# Patient Record
Sex: FEMALE | Race: WHITE | NOT HISPANIC OR LATINO | Employment: OTHER | URBAN - METROPOLITAN AREA
[De-identification: names, ages, dates, MRNs, and addresses within clinical notes are randomized per-mention and may not be internally consistent; named-entity substitution may affect disease eponyms.]

---

## 2021-04-20 ENCOUNTER — OFFICE VISIT - HEALTHEAST (OUTPATIENT)
Dept: FAMILY MEDICINE | Facility: CLINIC | Age: 36
End: 2021-04-20

## 2021-04-20 DIAGNOSIS — D23.9 DYSPLASTIC NEVI: ICD-10-CM

## 2021-04-20 DIAGNOSIS — Z13.29 SCREENING FOR THYROID DISORDER: ICD-10-CM

## 2021-04-20 DIAGNOSIS — N20.0 NEPHROLITHIASIS: ICD-10-CM

## 2021-04-20 DIAGNOSIS — Z13.220 SCREENING, LIPID: ICD-10-CM

## 2021-04-20 DIAGNOSIS — Z00.00 ROUTINE MEDICAL EXAM: ICD-10-CM

## 2021-04-20 DIAGNOSIS — Z97.5 IUD (INTRAUTERINE DEVICE) IN PLACE: ICD-10-CM

## 2021-04-20 DIAGNOSIS — Z13.1 SCREENING FOR DIABETES MELLITUS: ICD-10-CM

## 2021-04-20 LAB
ANION GAP SERPL CALCULATED.3IONS-SCNC: 8 MMOL/L (ref 5–18)
BUN SERPL-MCNC: 13 MG/DL (ref 8–22)
CALCIUM SERPL-MCNC: 9.1 MG/DL (ref 8.5–10.5)
CHLORIDE BLD-SCNC: 106 MMOL/L (ref 98–107)
CHOLEST SERPL-MCNC: 244 MG/DL
CO2 SERPL-SCNC: 26 MMOL/L (ref 22–31)
CREAT SERPL-MCNC: 0.75 MG/DL (ref 0.6–1.1)
FASTING STATUS PATIENT QL REPORTED: YES
GFR SERPL CREATININE-BSD FRML MDRD: >60 ML/MIN/1.73M2
GLUCOSE BLD-MCNC: 93 MG/DL (ref 70–125)
HDLC SERPL-MCNC: 62 MG/DL
LDLC SERPL CALC-MCNC: 166 MG/DL
POTASSIUM BLD-SCNC: 3.8 MMOL/L (ref 3.5–5)
SODIUM SERPL-SCNC: 140 MMOL/L (ref 136–145)
TRIGL SERPL-MCNC: 81 MG/DL
TSH SERPL DL<=0.005 MIU/L-ACNC: 2.2 UIU/ML (ref 0.3–5)

## 2021-04-20 ASSESSMENT — ANXIETY QUESTIONNAIRES
2. NOT BEING ABLE TO STOP OR CONTROL WORRYING: NOT AT ALL
7. FEELING AFRAID AS IF SOMETHING AWFUL MIGHT HAPPEN: NOT AT ALL
6. BECOMING EASILY ANNOYED OR IRRITABLE: NOT AT ALL
3. WORRYING TOO MUCH ABOUT DIFFERENT THINGS: NOT AT ALL
GAD7 TOTAL SCORE: 0
5. BEING SO RESTLESS THAT IT IS HARD TO SIT STILL: NOT AT ALL
1. FEELING NERVOUS, ANXIOUS, OR ON EDGE: NOT AT ALL
4. TROUBLE RELAXING: NOT AT ALL
IF YOU CHECKED OFF ANY PROBLEMS ON THIS QUESTIONNAIRE, HOW DIFFICULT HAVE THESE PROBLEMS MADE IT FOR YOU TO DO YOUR WORK, TAKE CARE OF THINGS AT HOME, OR GET ALONG WITH OTHER PEOPLE: NOT DIFFICULT AT ALL

## 2021-04-20 ASSESSMENT — MIFFLIN-ST. JEOR: SCORE: 1344.31

## 2021-04-20 ASSESSMENT — PATIENT HEALTH QUESTIONNAIRE - PHQ9: SUM OF ALL RESPONSES TO PHQ QUESTIONS 1-9: 0

## 2021-04-23 ENCOUNTER — IMMUNIZATION (OUTPATIENT)
Dept: NURSING | Facility: CLINIC | Age: 36
End: 2021-04-23
Payer: OTHER GOVERNMENT

## 2021-04-23 PROCEDURE — 0001A PR COVID VAC PFIZER DIL RECON 30 MCG/0.3 ML IM: CPT

## 2021-04-23 PROCEDURE — 91300 PR COVID VAC PFIZER DIL RECON 30 MCG/0.3 ML IM: CPT

## 2021-05-01 ENCOUNTER — HEALTH MAINTENANCE LETTER (OUTPATIENT)
Age: 36
End: 2021-05-01

## 2021-05-14 ENCOUNTER — IMMUNIZATION (OUTPATIENT)
Dept: NURSING | Facility: CLINIC | Age: 36
End: 2021-05-14
Attending: INTERNAL MEDICINE
Payer: OTHER GOVERNMENT

## 2021-05-14 PROCEDURE — 91300 PR COVID VAC PFIZER DIL RECON 30 MCG/0.3 ML IM: CPT

## 2021-05-14 PROCEDURE — 0002A PR COVID VAC PFIZER DIL RECON 30 MCG/0.3 ML IM: CPT

## 2021-05-21 ENCOUNTER — AMBULATORY - HEALTHEAST (OUTPATIENT)
Dept: FAMILY MEDICINE | Facility: CLINIC | Age: 36
End: 2021-05-21

## 2021-05-21 DIAGNOSIS — L91.8 SKIN TAGS, MULTIPLE ACQUIRED: ICD-10-CM

## 2021-05-21 DIAGNOSIS — D22.9 NEVUS: ICD-10-CM

## 2021-05-25 LAB
LAB AP CHARGES (HE HISTORICAL CONVERSION): NORMAL
PATH REPORT.COMMENTS IMP SPEC: NORMAL
PATH REPORT.FINAL DX SPEC: NORMAL
PATH REPORT.GROSS SPEC: NORMAL
PATH REPORT.MICROSCOPIC SPEC OTHER STN: NORMAL
PATH REPORT.RELEVANT HX SPEC: NORMAL
RESULT FLAG (HE HISTORICAL CONVERSION): NORMAL

## 2021-05-27 ASSESSMENT — PATIENT HEALTH QUESTIONNAIRE - PHQ9: SUM OF ALL RESPONSES TO PHQ QUESTIONS 1-9: 0

## 2021-05-28 ASSESSMENT — ANXIETY QUESTIONNAIRES: GAD7 TOTAL SCORE: 0

## 2021-06-05 VITALS
HEART RATE: 80 BPM | SYSTOLIC BLOOD PRESSURE: 102 MMHG | HEIGHT: 67 IN | DIASTOLIC BLOOD PRESSURE: 82 MMHG | OXYGEN SATURATION: 98 % | TEMPERATURE: 98.1 F | BODY MASS INDEX: 21.27 KG/M2 | WEIGHT: 135.5 LBS

## 2021-06-16 PROBLEM — Z97.5 IUD (INTRAUTERINE DEVICE) IN PLACE: Status: ACTIVE | Noted: 2021-04-20

## 2021-06-16 PROBLEM — D23.9 DYSPLASTIC NEVI: Status: ACTIVE | Noted: 2021-04-20

## 2021-06-16 PROBLEM — N20.0 NEPHROLITHIASIS: Status: ACTIVE | Noted: 2021-04-20

## 2021-06-16 NOTE — PROGRESS NOTES
FEMALE PREVENTATIVE EXAM    Assessment and Plan:     Patient has been advised of split billing requirements and indicates understanding: Yes    1. Routine medical exam    Generally the patient is doing very well.  We discussed healthy lifestyles, including adequate exercise (3-4 times a week for 20-30 minutes), and a healthy diet.  Patient should return for annual physicals, and we can also see them here as needed.     She is due for a Pap, and we will obtain those records as an LATOSHA was signed and we can get those and update her chart when we get them.    2. Dysplastic nevi    She has multiple moles which most of look very benign, but there is one in the low back that I suggested she come in and have removed and we can do that for her here and her at procedure visit.  She has a couple of others that are more like skin tags and we can remove those when she comes in as well.      3. IUD (intrauterine device) in place      4. Nephrolithiasis    She has had this history, but has not had a problem with it for some time.  She is supposed to get occasional scans to make sure that she is not getting any new ones, but she will await the records and see when she is supposed to do that next.    5. Screening for diabetes mellitus    - Basic Metabolic Panel    6. Screening, lipid    - Lipid Profile    7. Screening for thyroid disorder    - Thyroid Stimulating Hormone (TSH)     Next follow up:  No follow-ups on file.    Immunization Review  Adult Imm Review: No immunizations due today    I discussed the following with the patient:   Adult Healthy Living: Importance of regular exercise  Healthy nutrition  Getting adequate sleep  Stress management  Use of seat belts        Subjective:   Chief Complaint: Kathleen Wilson is an 35 y.o. female here for a preventative health visit.    Patient has been advised of split billing requirements and indicates understanding: Yes  HPI: The patient to our clinic was here today for an establish  "care physical.  She is generally doing quite well and has no major concerns or problems.  She just wants to review her health history and establish with us here.    As mentioned she gets her OB GYN care elsewhere and will obtain those records when they can.    She would like to have me take a look at a few moles that she has had for some time.  She thinks there is been a new one pop up on her low back which is rather dark.  Otherwise the other ones are quite stable.    She has a history of kidney stones but has not been bothered by them for some time.    Healthy Habits  Are you taking a daily aspirin? No  Do you typically exercising at least 40 min, 3-4 times per week?  NO  Do you usually eat at least 4 servings of fruit and vegetables a day, include whole grains and fiber and avoid regularly eating high fat foods? NO  Have you had an eye exam in the past two years? NO  Do you see a dentist twice per year? NO  Do you have any concerns regarding sleep? No      No data recorded    Review of Systems:  Please see above.  The rest of the review of systems are negative for all systems.     Pap History:   No - age 30-65 PAP every 3 years recommended  Cancer Screening       Status Date      PAP SMEAR Overdue 9/25/2008           Patient Care Team:  Alphonso Moreira MD as PCP - General (Family Medicine)        History     Reviewed By Date/Time Sections Reviewed    Alphonso Moreira MD 4/20/2021 10:47 AM Medical, Surgical, Tobacco, Alcohol, Drug Use, Sexual Activity, Family    Nathalie Lopez, [facility] 4/20/2021 10:30 AM Tobacco        Patient is new patient to the clinic. Please see past medical history, surgical history, social history and family history, all of which were completed in their entirety today.     Objective:   Vital Signs:   Visit Vitals  /82 (Patient Site: Left Arm, Patient Position: Sitting, Cuff Size: Adult Regular)   Pulse 80   Temp 98.1  F (36.7  C) (Oral)   Ht 5' 6.5\" (1.689 m)   Wt 135 lb 8 oz (61.5 kg) "   LMP 04/11/2021   SpO2 98%   BMI 21.54 kg/m           PHYSICAL EXAM    Well developed, well nourished, no acute distress.  HEENT: normocephalic/atraumatic, PERRLA/EOMI, TMs: Gray, normal light reflex, no nasal discharge.  Oral mucosa: no erythema/exudate  Neck: No LAD/masses/thyromegaly/bruits  Lungs: clear bilaterally  Heart: regular rate and rhythm, no murmurs/gallops/rubs  Breasts: symmetric, no masses/skin changes, nipple discharge, or axillary LAD.  BSE reviewed.  Abdomen: Normal bowel sounds, soft, non-tender, non-distended, no masses, neg Gabriel's/McBurney's, no rebound/guarding  Lymphatics: no supraclavicular/axillary/epitrochlear/inguinal LAD. No edema.  Neuro: A&O x 3, CN II-XII intact, strength 5/5, reflexes symmetric, sensory intact to light touch.  Psych: Behavior appropriate, engaging.  Thought processes congruent, non-tangential.  Musculoskeletal: no gross deformities.  Skin: As mentioned, she has a small but dark mole at the waistline on the back.  She also has a couple of other skin tags as detailed.           Medication List      as of April 20, 2021  3:19 PM     You have not been prescribed any medications.         Additional Screenings Completed Today:

## 2021-06-17 NOTE — PROGRESS NOTES
Assessment & Plan     Nevus    PROCEDURE NOTE    PRE-OP DIAGNOSIS:  Compound Nevus    PROCEDURE:  Skin Lesion Excision(s)    INDICATIONS:  Kathleen Wilson is a 35 y.o. female who presents for minor skin surgery.  The patient understands all risks, benefits, indications, potential complications, and alternatives, and freely consents for the procedure.  The patient also understands the option of performing no surgery, the risk for scarring, and the technique of the procedure.    ANESTHESIA:  Local.    TECHNIQUE:  After informed consent was obtained, and after the skin was prepped and draped, 1% lidocaine with epinephrine for anesthetic was injected around and underneath the site.   punch biopsy was performed.  A dressing was applied and wound care instructions were provided.  Lynnette tolerated the procedure well and without complications.  The patient will be alert for any signs of cutaneous infection and will follow up as instructed.      - Surgical Pathology Exam    Skin tags, multiple acquired    2 skin tags removed trunk.  Lesions were typical for skin tags without any evidence of infection. Lesions had small stalk to them and after cleansing with alcohol were removed using scissors and forceps. Silver nitrite was used to control the minimal bleeding which was encountered at time of removal. Area dressed with bandaid following procedure. Patient tolerated procedure well. :[id number]}     No follow-ups on file.    Alphonso Moreira MD  [facility]   Kathleen Wilson is 35 y.o. and presents today for the following health issues   HPI     Patient here today for a couple of skin procedures.  She had a mole on the bottom of her low back which was apparently new, and she did not know how long it has been there and its pretty dark so we decided to remove it.    She also has a couple of skin tags around on her trunk that she would like to have removed today as well as they are  irritating and bothersome today.      Review of Systems        Objective    BP 94/66 (Patient Site: Left Arm, Patient Position: Sitting, Cuff Size: Adult Regular)   Pulse 82   Wt 137 lb 6.4 oz (62.3 kg)   SpO2 98%   BMI 21.84 kg/m    Body mass index is 21.84 kg/m .  Physical Exam    The mole is about 0.6 cm in diameter down in the lower part of her lower back.  There is some slight asymmetry and irregular borders to it.  And the skin tags are typical in appearance for skin tags.

## 2021-07-06 VITALS
HEART RATE: 82 BPM | DIASTOLIC BLOOD PRESSURE: 66 MMHG | OXYGEN SATURATION: 98 % | SYSTOLIC BLOOD PRESSURE: 94 MMHG | BODY MASS INDEX: 21.84 KG/M2 | WEIGHT: 137.4 LBS

## 2021-10-11 ENCOUNTER — HEALTH MAINTENANCE LETTER (OUTPATIENT)
Age: 36
End: 2021-10-11

## 2021-11-10 ENCOUNTER — IMMUNIZATION (OUTPATIENT)
Dept: FAMILY MEDICINE | Facility: CLINIC | Age: 36
End: 2021-11-10
Payer: OTHER GOVERNMENT

## 2021-11-10 PROCEDURE — 90471 IMMUNIZATION ADMIN: CPT

## 2021-11-10 PROCEDURE — 90686 IIV4 VACC NO PRSV 0.5 ML IM: CPT

## 2021-12-29 ENCOUNTER — VIRTUAL VISIT (OUTPATIENT)
Dept: FAMILY MEDICINE | Facility: CLINIC | Age: 36
End: 2021-12-29
Payer: OTHER GOVERNMENT

## 2021-12-29 DIAGNOSIS — Z20.822 SUSPECTED 2019 NOVEL CORONAVIRUS INFECTION: Primary | ICD-10-CM

## 2021-12-29 PROCEDURE — 99212 OFFICE O/P EST SF 10 MIN: CPT | Mod: 95 | Performed by: FAMILY MEDICINE

## 2021-12-29 NOTE — PROGRESS NOTES
Kathleen is a 36 year old who is being evaluated via a billable telephone visit.      What phone number would you like to be contacted at? [phone]  How would you like to obtain your AVS?     Assessment & Plan     Suspected 2019 novel coronavirus infection  Patient will be screened for COVID-19  - Symptomatic; Yes; 12/24/2021 COVID-19 Virus (Coronavirus) by PCR                   No follow-ups on file.    Ahmet Avila MD  [facility]   Kathleen is a 36 year old who presents for the following health issues patient was exposed to positive COVID-19 patient on Abdirashid Diane and is now symptomatic requesting tests in the [facility] area.  Order will be placed children are also symptomatic    HPI       Patient with symptoms consistent with COVID-19 myalgias sore throat children also somewhat sick.  All were exposed on Abdirashid Diane test will be placedConstitutional, HEENT, cardiovascular, pulmonary, gi and gu systems are negative, except as otherwise noted.    Review of Systems   Constitutional, HEENT, cardiovascular, pulmonary, gi and gu systems are negative, except as otherwise noted.      Objective           Vitals:  No vitals were obtained today due to virtual visit.    Physical Exam   healthy, alert, no distress and mild distress  PSYCH: Alert and oriented times 3; coherent speech, normal   rate and volume, able to articulate logical thoughts, able   to abstract reason, no tangential thoughts, no hallucinations   or delusions  Her affect is normal and pleasant  RESP: No cough, no audible wheezing, able to talk in full sentences  Remainder of exam unable to be completed due to telephone visits                Phone call duration: 12 minutes

## 2021-12-30 ENCOUNTER — LAB (OUTPATIENT)
Dept: FAMILY MEDICINE | Facility: CLINIC | Age: 36
End: 2021-12-30
Attending: FAMILY MEDICINE
Payer: OTHER GOVERNMENT

## 2021-12-30 DIAGNOSIS — Z20.822 SUSPECTED 2019 NOVEL CORONAVIRUS INFECTION: ICD-10-CM

## 2021-12-30 PROCEDURE — U0005 INFEC AGEN DETEC AMPLI PROBE: HCPCS

## 2021-12-30 PROCEDURE — U0003 INFECTIOUS AGENT DETECTION BY NUCLEIC ACID (DNA OR RNA); SEVERE ACUTE RESPIRATORY SYNDROME CORONAVIRUS 2 (SARS-COV-2) (CORONAVIRUS DISEASE [COVID-19]), AMPLIFIED PROBE TECHNIQUE, MAKING USE OF HIGH THROUGHPUT TECHNOLOGIES AS DESCRIBED BY CMS-2020-01-R: HCPCS

## 2021-12-31 ENCOUNTER — TELEPHONE (OUTPATIENT)
Dept: NURSING | Facility: CLINIC | Age: 36
End: 2021-12-31
Payer: OTHER GOVERNMENT

## 2021-12-31 LAB — SARS-COV-2 RNA RESP QL NAA+PROBE: NEGATIVE

## 2021-12-31 NOTE — TELEPHONE ENCOUNTER

## 2022-03-15 ENCOUNTER — OFFICE VISIT (OUTPATIENT)
Dept: FAMILY MEDICINE | Facility: CLINIC | Age: 37
End: 2022-03-15
Payer: OTHER GOVERNMENT

## 2022-03-15 VITALS
DIASTOLIC BLOOD PRESSURE: 74 MMHG | SYSTOLIC BLOOD PRESSURE: 110 MMHG | HEART RATE: 76 BPM | WEIGHT: 129.2 LBS | BODY MASS INDEX: 20.54 KG/M2

## 2022-03-15 DIAGNOSIS — H65.01 NON-RECURRENT ACUTE SEROUS OTITIS MEDIA OF RIGHT EAR: Primary | ICD-10-CM

## 2022-03-15 PROCEDURE — 99213 OFFICE O/P EST LOW 20 MIN: CPT | Performed by: FAMILY MEDICINE

## 2022-03-15 NOTE — PROGRESS NOTES
Assessment/plan   Kathleen Wilson is a 36 year old female who is New  patient to my practice here with chief complaint     Patient presents with:  Ear Pain: Right ear pain that goes down into her lymph nodes.  Started Sunday but yesterday was worse       Kathleen was seen today for ear pain.    Diagnoses and all orders for this visit:    Non-recurrent acute serous otitis media of right ear  Comments:  No sign of infection. Patient told to take benadryl and taught to peform vlasalva maneuver to open up eustacian tubes.          Subjective:      HPI: Kathleen Wilson is a 36 year old female is here for.    Patient is a 35 y/o female here today with complaint of ear pain on the right side that radiated down neck. Discomfort  started on Sunday, ear pain was worse last night. No fever or congestion. Was in the [place] a couple of days ago and ear pain started after. No acute hearing loss. No popping. No draininge. No itching. She does use cue tips occasionally but had not used it recently. Her daughter was diagnosed with otitis media recently as well.      I have personally reviewed the patient's allergies, medications, past medical history, family history, social history, rooming notes and problem list in detail and updated the patient record as necessary.      No past medical history on file.  No past surgical history on file.  Amoxicillin  No current outpatient medications on file.     Family History   Problem Relation Age of Onset     Hyperlipidemia Mother      Hypertension Father        Patient Active Problem List   Diagnosis     IUD (intrauterine device) in place     Nephrolithiasis     Dysplastic nevi       Review of Systems   12 point comprehensive review of systems was negative except as noted and HPI     Social History     Social History Narrative     Not on file       Objective:     Vitals:    03/15/22 0954   BP: 110/74   Pulse: 76   Weight: 58.6 kg (129 lb 3.2 oz)       Physical Exam:   Physical  Exam:  General Appearance:  Appears comfortable, Alert, cooperative, no distress,   Head: Normocephalic, without obvious abnormality, atraumatic  Eyes: PERRL, conjunctiva/corneas clear, EOM's intact, both eyes             ENT: Lips, mucosa, and tongue normal; teeth and gums normal, cone of light visible, not erythema,swelling, or foreign bodies in external ear.  Neck: Supple, symmetrical, trachea midline, some right submandibular lymph node tenderness;                      Lungs: Clear to auscultation bilaterally, respirations unlabored  Heart: Regular rate and rhythm, S1 and S2 normal, no murmur, rubs or gallop       15 minutes spent on the day of encounter doing chart review, history and exam, documentation, and further activities as noted.     This note has been dictated using voice recognition software. Any grammatical or context distortions are unintentional and inherent to the software    Dorothea Snider MD     There are no Patient Instructions on file for this visit.

## 2022-05-22 ENCOUNTER — HEALTH MAINTENANCE LETTER (OUTPATIENT)
Age: 37
End: 2022-05-22

## 2022-06-28 ENCOUNTER — TELEPHONE (OUTPATIENT)
Dept: FAMILY MEDICINE | Facility: CLINIC | Age: 37
End: 2022-06-28

## 2022-06-28 NOTE — TELEPHONE ENCOUNTER
Reason for Call:  Other appointment    Detailed comments: patient says that she can no longer feel the IUD string and would like for someone to see if maybe her IUD has moved. Please call her asap, she is expecting a call back.     Phone Number Patient can be reached at: Cell number on file:    Telephone Information:   Mobile [phone]       Best Time: any time throughout the day     Can we leave a detailed message on this number? YES    Call taken on 6/28/2022 at 12:28 PM by Fabian Mtz

## 2022-07-04 ENCOUNTER — NURSE TRIAGE (OUTPATIENT)
Dept: NURSING | Facility: CLINIC | Age: 37
End: 2022-07-04

## 2022-07-04 NOTE — TELEPHONE ENCOUNTER
Poked in eye .  Now today hurts.  Clear vision.    Hurting like it just happened again.    Per protocol needs to go to ER.      Blank Hernandez RN  [facility] Nurse Advisor        Reason for Disposition    [1] Sharp FB (even if FB was removed) AND [2] any pain present now    Additional Information    Negative: Doesn't sound like foreign body (FB) in the eye    Negative: Foreign body is a piece of chemical    Negative: Foreign body (FB) stuck on eyeball (Exception: contact lens)    Protocols used: EYE - FOREIGN BODY-A-AH

## 2022-09-25 ENCOUNTER — HEALTH MAINTENANCE LETTER (OUTPATIENT)
Age: 37
End: 2022-09-25

## 2022-11-05 ENCOUNTER — IMMUNIZATION (OUTPATIENT)
Dept: PEDIATRICS | Facility: CLINIC | Age: 37
End: 2022-11-05
Payer: OTHER GOVERNMENT

## 2022-11-05 DIAGNOSIS — Z23 NEED FOR PROPHYLACTIC VACCINATION AND INOCULATION AGAINST INFLUENZA: Primary | ICD-10-CM

## 2022-11-05 PROCEDURE — 90686 IIV4 VACC NO PRSV 0.5 ML IM: CPT

## 2022-11-05 PROCEDURE — 90471 IMMUNIZATION ADMIN: CPT

## 2022-11-05 PROCEDURE — 99207 PR NO CHARGE NURSE ONLY: CPT

## 2023-06-04 ENCOUNTER — HEALTH MAINTENANCE LETTER (OUTPATIENT)
Age: 38
End: 2023-06-04

## 2023-06-12 ENCOUNTER — OFFICE VISIT (OUTPATIENT)
Dept: FAMILY MEDICINE | Facility: CLINIC | Age: 38
End: 2023-06-12
Payer: OTHER GOVERNMENT

## 2023-06-12 VITALS
SYSTOLIC BLOOD PRESSURE: 90 MMHG | TEMPERATURE: 97.9 F | HEART RATE: 74 BPM | WEIGHT: 136.9 LBS | HEIGHT: 66 IN | BODY MASS INDEX: 22 KG/M2 | DIASTOLIC BLOOD PRESSURE: 60 MMHG | OXYGEN SATURATION: 100 %

## 2023-06-12 DIAGNOSIS — N92.6 IRREGULAR BLEEDING: ICD-10-CM

## 2023-06-12 DIAGNOSIS — Z13.220 SCREENING CHOLESTEROL LEVEL: ICD-10-CM

## 2023-06-12 DIAGNOSIS — Z11.4 SCREENING FOR HIV (HUMAN IMMUNODEFICIENCY VIRUS): ICD-10-CM

## 2023-06-12 DIAGNOSIS — Z97.5 IUD (INTRAUTERINE DEVICE) IN PLACE: Primary | ICD-10-CM

## 2023-06-12 DIAGNOSIS — N20.0 NEPHROLITHIASIS: ICD-10-CM

## 2023-06-12 DIAGNOSIS — Z12.4 CERVICAL CANCER SCREENING: ICD-10-CM

## 2023-06-12 DIAGNOSIS — Z13.29 SCREENING FOR THYROID DISORDER: ICD-10-CM

## 2023-06-12 DIAGNOSIS — Z11.59 NEED FOR HEPATITIS C SCREENING TEST: ICD-10-CM

## 2023-06-12 LAB
ALBUMIN UR-MCNC: NEGATIVE MG/DL
APPEARANCE UR: CLEAR
BILIRUB UR QL STRIP: NEGATIVE
COLOR UR AUTO: YELLOW
GLUCOSE UR STRIP-MCNC: NEGATIVE MG/DL
HGB UR QL STRIP: NEGATIVE
KETONES UR STRIP-MCNC: NEGATIVE MG/DL
LEUKOCYTE ESTERASE UR QL STRIP: NEGATIVE
NITRATE UR QL: NEGATIVE
PH UR STRIP: 7 [PH] (ref 5–8)
SP GR UR STRIP: 1.01 (ref 1–1.03)
UROBILINOGEN UR STRIP-ACNC: 0.2 E.U./DL

## 2023-06-12 PROCEDURE — 81003 URINALYSIS AUTO W/O SCOPE: CPT | Performed by: FAMILY MEDICINE

## 2023-06-12 PROCEDURE — 80061 LIPID PANEL: CPT | Performed by: FAMILY MEDICINE

## 2023-06-12 PROCEDURE — G0145 SCR C/V CYTO,THINLAYER,RESCR: HCPCS | Performed by: FAMILY MEDICINE

## 2023-06-12 PROCEDURE — 36415 COLL VENOUS BLD VENIPUNCTURE: CPT | Performed by: FAMILY MEDICINE

## 2023-06-12 PROCEDURE — 80053 COMPREHEN METABOLIC PANEL: CPT | Performed by: FAMILY MEDICINE

## 2023-06-12 PROCEDURE — 87389 HIV-1 AG W/HIV-1&-2 AB AG IA: CPT | Performed by: FAMILY MEDICINE

## 2023-06-12 PROCEDURE — 87624 HPV HI-RISK TYP POOLED RSLT: CPT | Performed by: FAMILY MEDICINE

## 2023-06-12 PROCEDURE — 84443 ASSAY THYROID STIM HORMONE: CPT | Performed by: FAMILY MEDICINE

## 2023-06-12 PROCEDURE — 99214 OFFICE O/P EST MOD 30 MIN: CPT | Performed by: FAMILY MEDICINE

## 2023-06-12 PROCEDURE — 86803 HEPATITIS C AB TEST: CPT | Performed by: FAMILY MEDICINE

## 2023-06-12 RX ORDER — TOBRAMYCIN 3 MG/ML
1 SOLUTION/ DROPS OPHTHALMIC EVERY 4 HOURS
COMMUNITY
Start: 2022-07-04 | End: 2023-06-12

## 2023-06-12 RX ORDER — SODIUM FLUORIDE 6 MG/ML
PASTE, DENTIFRICE DENTAL
COMMUNITY
Start: 2023-03-20 | End: 2023-06-12

## 2023-06-12 NOTE — PROGRESS NOTES
Assessment & Plan   Problem List Items Addressed This Visit        Urinary    IUD (intrauterine device) in place - Primary    Nephrolithiasis    Relevant Orders    Comprehensive metabolic panel (BMP + Alb, Alk Phos, ALT, AST, Total. Bili, TP) (Completed)    UA Macroscopic with reflex to Microscopic and Culture (Completed)    CT Abdomen Pelvis w Contrast   Other Visit Diagnoses     Screening for HIV (human immunodeficiency virus)        Relevant Orders    HIV Antigen Antibody Combo (Completed)    Need for hepatitis C screening test        Relevant Orders    Hepatitis C Screen Reflex to HCV RNA Quant and Genotype (Completed)    Cervical cancer screening        Relevant Orders    Pap Screen with HPV - recommended age 30 - 65 years (Completed)    HPV Hold (Lab Only) (Completed)    HPV High Risk Types DNA Cervical (Completed)    Irregular bleeding        Screening for thyroid disorder        Relevant Orders    TSH with free T4 reflex (Completed)    Screening cholesterol level        Relevant Orders    Lipid panel reflex to direct LDL Fasting (Completed)            ESTER MORELAND MD  [facility] is a 37 year [facility], presenting for the following health issues:    IUD (--would like checked today/--had a whole month of vaginal bleeding a couple months ago? Hasn't happened since per pt/--c/o approx 12 hours after intercourse having 'severe abdomen cramping' for a couple hours; this has happened 2-3 times/--[place] [employer] [place]/Feb 2020), Thyroid Problem (--discuss having a lab test done to check levels), and Kidney Problem (--would like to discuss past kidney stones in March 2020; no sx now)        6/12/2023     3:05 PM   Additional Questions   Roomed by Nikki CAT CMA   Accompanied by self         6/12/2023     3:05 PM   Patient Reported Additional Medications   Patient reports taking the following new medications na     IUD    History of Present Illness  "      Reason for visit:  Iud check, check up, kidney stone discussion    She eats 2-3 servings of fruits and vegetables daily.She consumes 0 sweetened beverage(s) daily.She exercises with enough effort to increase her heart rate 10 to 19 minutes per day.  She exercises with enough effort to increase her heart rate 3 or less days per week.   She is taking medications regularly.              Objective    BP 90/60 (BP Location: Left arm, Patient Position: Sitting, Cuff Size: Adult Regular)   Pulse 74   Temp 97.9  F (36.6  C) (Oral)   Ht 1.676 m (5' 6\")   Wt 62.1 kg (136 lb 14.4 oz)   LMP  (LMP Unknown)   SpO2 100%   BMI 22.10 kg/m    Body mass index is 22.1 kg/m .  Physical Exam   GENERAL: healthy, alert and no distress  NECK: no adenopathy, no asymmetry, masses, or scars and thyroid normal to palpation  RESP: lungs clear to auscultation - no rales, rhonchi or wheezes  CV: regular rate and rhythm, normal S1 S2, no S3 or S4, no murmur, click or rub, no peripheral edema and peripheral pulses strong  ABDOMEN: soft, nontender, no hepatosplenomegaly, no masses and bowel sounds normal   (female): normal female external genitalia, normal urethral meatus , vaginal mucosa pink, moist, well rugated and cervix appears normal, strings present (IUD)  MS: no gross musculoskeletal defects noted, no edema             "

## 2023-06-13 ENCOUNTER — MYC MEDICAL ADVICE (OUTPATIENT)
Dept: FAMILY MEDICINE | Facility: CLINIC | Age: 38
End: 2023-06-13
Payer: OTHER GOVERNMENT

## 2023-06-13 DIAGNOSIS — Z97.5 IUD (INTRAUTERINE DEVICE) IN PLACE: ICD-10-CM

## 2023-06-13 LAB
ALBUMIN SERPL BCG-MCNC: 4.5 G/DL (ref 3.5–5.2)
ALP SERPL-CCNC: 48 U/L (ref 35–104)
ALT SERPL W P-5'-P-CCNC: 13 U/L (ref 0–50)
ANION GAP SERPL CALCULATED.3IONS-SCNC: 11 MMOL/L (ref 7–15)
AST SERPL W P-5'-P-CCNC: 19 U/L (ref 0–45)
BILIRUB SERPL-MCNC: 0.4 MG/DL
BUN SERPL-MCNC: 6.9 MG/DL (ref 6–20)
CALCIUM SERPL-MCNC: 9.2 MG/DL (ref 8.6–10)
CHLORIDE SERPL-SCNC: 104 MMOL/L (ref 98–107)
CHOLEST SERPL-MCNC: 219 MG/DL
CREAT SERPL-MCNC: 0.59 MG/DL (ref 0.51–0.95)
DEPRECATED HCO3 PLAS-SCNC: 25 MMOL/L (ref 22–29)
GFR SERPL CREATININE-BSD FRML MDRD: >90 ML/MIN/1.73M2
GLUCOSE SERPL-MCNC: 89 MG/DL (ref 70–99)
HCV AB SERPL QL IA: NONREACTIVE
HDLC SERPL-MCNC: 84 MG/DL
HIV 1+2 AB+HIV1 P24 AG SERPL QL IA: NONREACTIVE
LDLC SERPL CALC-MCNC: 120 MG/DL
NONHDLC SERPL-MCNC: 135 MG/DL
POTASSIUM SERPL-SCNC: 3.9 MMOL/L (ref 3.4–5.3)
PROT SERPL-MCNC: 7.2 G/DL (ref 6.4–8.3)
SODIUM SERPL-SCNC: 140 MMOL/L (ref 136–145)
TRIGL SERPL-MCNC: 73 MG/DL
TSH SERPL DL<=0.005 MIU/L-ACNC: 2.32 UIU/ML (ref 0.3–4.2)

## 2023-06-14 NOTE — TELEPHONE ENCOUNTER
IUD: Mirena  Placed: [id number]  Needs to be removed by: [id number]  [facility]  [phone]      Please add information to Med list/problem list

## 2023-06-16 LAB
BKR LAB AP GYN ADEQUACY: NORMAL
BKR LAB AP GYN INTERPRETATION: NORMAL
BKR LAB AP HPV REFLEX: NORMAL
BKR LAB AP PREVIOUS ABNORMAL: NORMAL
PATH REPORT.COMMENTS IMP SPEC: NORMAL
PATH REPORT.COMMENTS IMP SPEC: NORMAL
PATH REPORT.RELEVANT HX SPEC: NORMAL

## 2023-06-20 LAB
HUMAN PAPILLOMA VIRUS 16 DNA: NEGATIVE
HUMAN PAPILLOMA VIRUS 18 DNA: NEGATIVE
HUMAN PAPILLOMA VIRUS FINAL DIAGNOSIS: NORMAL
HUMAN PAPILLOMA VIRUS OTHER HR: NEGATIVE

## 2023-10-23 ENCOUNTER — VIRTUAL VISIT (OUTPATIENT)
Dept: FAMILY MEDICINE | Facility: CLINIC | Age: 38
End: 2023-10-23
Payer: OTHER GOVERNMENT

## 2023-10-23 DIAGNOSIS — L98.9 SKIN LESION: Primary | ICD-10-CM

## 2023-10-23 PROCEDURE — 99213 OFFICE O/P EST LOW 20 MIN: CPT | Mod: VID | Performed by: NURSE PRACTITIONER

## 2023-10-23 NOTE — PROGRESS NOTES
Kathleen is a 38 year old who is being evaluated via a billable video visit.      How would you like to obtain your AVS? MyChart  If the video visit is dropped, the invitation should be resent by: Text to cell phone: [phone]  Will anyone else be joining your video visit? No          Assessment & Plan     Skin lesion    - Adult Dermatology  Referral    -Referral for dermatology ordered.  I reviewed chart notes from previous encounters related to a mole.  No indication of infection noted at her visit today.         ALFONSO [facility]   Kathleen is a 38 year old, presenting for the following health issues:  Mole (Mole on right mid breast have had it looked at by several providers in past but Recently hurted with touching it and would like referral)      10/23/2023     4:06 PM   Additional Questions   Roomed by Rehana     -She stated that she has a mobile to her chest area.  She had had previous providers look at it and suggested that she get it removed.  She stated that the mole is irregular shaped, it is flat, without any redness or drainage.  She stated that she scratched it the other day and it hurt.  She denied any other symptoms.  No history of skin cancer.     HPI     Concern - mole on right breast  Onset: have had for a while but in last 2 months hurt to touch  Description: no changes with size or description of mole            Review of Systems         Objective    Vitals - Patient Reported  Weight (Patient Reported): 58.1 kg (128 lb)        Physical Exam   GENERAL: Healthy, alert and no distress  EYES: Eyes grossly normal to inspection.  No discharge or erythema, or obvious scleral/conjunctival abnormalities.  RESP: No audible wheeze, cough, or visible cyanosis.  No visible retractions or increased work of breathing.    SKIN: Visible skin clear. No significant rash, abnormal pigmentation or lesions.  NEURO: Cranial nerves grossly  intact.  Mentation and speech appropriate for age.  PSYCH: Mentation appears normal, affect normal/bright, judgement and insight intact, normal speech and appearance well-groomed.    Office Visit on 06/12/2023   Component Date Value Ref Range Status    Interpretation 06/12/2023 Negative for Intraepithelial Lesion or Malignancy (NILM)    Final    Comment 06/12/2023    Final                    Value:This result contains rich text formatting which cannot be displayed here.    Specimen Adequacy 06/12/2023 Satisfactory for evaluation, endocervical/transformation zone component present   Final    Clinical Information 06/12/2023    Final                    Value:This result contains rich text formatting which cannot be displayed here.    Reflex Testing 06/12/2023 Yes regardless of result   Final    Previous Abnormal? 06/12/2023    Final                    Value:This result contains rich text formatting which cannot be displayed here.    Performing Labs 06/12/2023    Final                    Value:This result contains rich text formatting which cannot be displayed here.    Color Urine 06/12/2023 Yellow  Colorless, Straw, Light Yellow, Yellow Final    Appearance Urine 06/12/2023 Clear  Clear Final    Glucose Urine 06/12/2023 Negative  Negative mg/dL Final    Bilirubin Urine 06/12/2023 Negative  Negative Final    Ketones Urine 06/12/2023 Negative  Negative mg/dL Final    Specific Gravity Urine 06/12/2023 1.010  1.005 - 1.030 Final    Blood Urine 06/12/2023 Negative  Negative Final    pH Urine 06/12/2023 7.0  5.0 - 8.0 Final    Protein Albumin Urine 06/12/2023 Negative  Negative mg/dL Final    Urobilinogen Urine 06/12/2023 0.2  0.2, 1.0 E.U./dL Final    Nitrite Urine 06/12/2023 Negative  Negative Final    Leukocyte Esterase Urine 06/12/2023 Negative  Negative Final    HIV Antigen Antibody Combo 06/12/2023 Nonreactive  Nonreactive Final    HIV-1 p24 Ag & HIV-1/HIV-2 Ab Not Detected    Hepatitis C Antibody 06/12/2023 Nonreactive   Nonreactive Final    Sodium 06/12/2023 140  136 - 145 mmol/L Final    Potassium 06/12/2023 3.9  3.4 - 5.3 mmol/L Final    Chloride 06/12/2023 104  98 - 107 mmol/L Final    Carbon Dioxide (CO2) 06/12/2023 25  22 - 29 mmol/L Final    Anion Gap 06/12/2023 11  7 - 15 mmol/L Final    Urea Nitrogen 06/12/2023 6.9  6.0 - 20.0 mg/dL Final    Creatinine 06/12/2023 0.59  0.51 - 0.95 mg/dL Final    Calcium 06/12/2023 9.2  8.6 - 10.0 mg/dL Final    Glucose 06/12/2023 89  70 - 99 mg/dL Final    Alkaline Phosphatase 06/12/2023 48  35 - 104 U/L Final    AST 06/12/2023 19  0 - 45 U/L Final    Reference intervals for this test were updated on 6/12/2023 to more accurately reflect our healthy population. There may be differences in the flagging of prior results with similar values performed with this method. Interpretation of those prior results can be made in the context of the updated reference intervals.    ALT 06/12/2023 13  0 - 50 U/L Final    Reference intervals for this test were updated on 6/12/2023 to more accurately reflect our healthy population. There may be differences in the flagging of prior results with similar values performed with this method. Interpretation of those prior results can be made in the context of the updated reference intervals.      Protein Total 06/12/2023 7.2  6.4 - 8.3 g/dL Final    Albumin 06/12/2023 4.5  3.5 - 5.2 g/dL Final    Bilirubin Total 06/12/2023 0.4  <=1.2 mg/dL Final    GFR Estimate 06/12/2023 >90  >60 mL/min/1.73m2 Final    eGFR calculated using 2021 CKD-EPI equation.    TSH 06/12/2023 2.32  0.30 - 4.20 uIU/mL Final    Cholesterol 06/12/2023 219 (H)  <200 mg/dL Final    Triglycerides 06/12/2023 73  <150 mg/dL Final    Direct Measure HDL 06/12/2023 84  >=50 mg/dL Final    LDL Cholesterol Calculated 06/12/2023 120 (H)  <=100 mg/dL Final    Non HDL Cholesterol 06/12/2023 135 (H)  <130 mg/dL Final    Other HR HPV 06/12/2023 Negative  Negative Final    HPV16 DNA 06/12/2023 Negative   Negative Final    HPV18 DNA 06/12/2023 Negative  Negative Final    FINAL DIAGNOSIS 06/12/2023    Final                    Value:This result contains rich text formatting which cannot be displayed here.               Video-Visit Details    Type of service:  Video Visit     Originating Location (pt. Location): Home    Distant Location (provider location):  On-site  Platform used for Video Visit: Baudilio

## 2023-11-22 ENCOUNTER — IMMUNIZATION (OUTPATIENT)
Dept: FAMILY MEDICINE | Facility: CLINIC | Age: 38
End: 2023-11-22
Payer: OTHER GOVERNMENT

## 2023-11-22 ENCOUNTER — MYC MEDICAL ADVICE (OUTPATIENT)
Dept: FAMILY MEDICINE | Facility: CLINIC | Age: 38
End: 2023-11-22

## 2023-11-22 ENCOUNTER — TELEPHONE (OUTPATIENT)
Dept: FAMILY MEDICINE | Facility: CLINIC | Age: 38
End: 2023-11-22

## 2023-11-22 DIAGNOSIS — Z23 ENCOUNTER FOR IMMUNIZATION: Primary | ICD-10-CM

## 2023-11-22 PROCEDURE — 90471 IMMUNIZATION ADMIN: CPT

## 2023-11-22 PROCEDURE — 90686 IIV4 VACC NO PRSV 0.5 ML IM: CPT

## 2023-11-22 NOTE — TELEPHONE ENCOUNTER
Patient is requesting status regarding the Dermatology referral placed by ALFONSO Holman CNP on 10/23/2023.    Patient noted in her [employer] message:  The  confirmed that an authorization was needed and it needs to be submitted on the [employer] website under  submit a referral.  She said to call [facility] if you have any questions and they will assist. Please let me know if you are successful so I can schedule an appointment with dermatology. Thank you!!     Please assist patient with referral.   Thank you,  Mana GARCIA RN  [facility]

## 2023-11-22 NOTE — TELEPHONE ENCOUNTER
Lois mess sent to pt/Kathleen regarding Dermatology insurance referral to [facility].    ~Nona  M [facility] Referral Coordinator

## 2023-11-22 NOTE — TELEPHONE ENCOUNTER
First Name :Kathleen Last Name :Steve   : 1985 Sponsor SSN :[id number]  Plan Type :  [employer] ADSM :TPR_ADFM    Primary Care Manager Name : Stacy Benson    An approval from [employer] is required for this service.    A request should be submitted to [facility] [employer] by the beneficiary s  hospital or clinic or civilian primary care manager/provider, or specialist who has received a [employer] approval and is referring to a different specialist for the same condition (for example, an orthopedic specialist is treating a foot condition that requires a podiatrist or a cardiologist determines the underlying condition requires an oncologist).    To view the beneficiary s costs, visit our Copayment and Cost-Share Information page.    The Prior Authorization, Referral and Benefit Tool does not provide you with approval for services. Providers may use our Online Authorization and Referral Submission tool to submit a request.    This information is valid as of  04:09 PM EST

## 2023-11-22 NOTE — PROGRESS NOTES
Prior to immunization administration, verified patients identity using patient s name and date of birth. Please see Immunization Activity for additional information.     Screening Questionnaire for Adult Immunization    Are you sick today?   No   Do you have allergies to medications, food, a vaccine component or latex?   No   Have you ever had a serious reaction after receiving a vaccination?   No   Do you have a long-term health problem with heart, lung, kidney, or metabolic disease (e.g., diabetes), asthma, a blood disorder, no spleen, complement component deficiency, a cochlear implant, or a spinal fluid leak?  Are you on long-term aspirin therapy?   No   Do you have cancer, leukemia, HIV/AIDS, or any other immune system problem?   No   Do you have a parent, brother, or sister with an immune system problem?   No   In the past 3 months, have you taken medications that affect  your immune system, such as prednisone, other steroids, or anticancer drugs; drugs for the treatment of rheumatoid arthritis, Crohn s disease, or psoriasis; or have you had radiation treatments?   No   Have you had a seizure, or a brain or other nervous system problem?   No   During the past year, have you received a transfusion of blood or blood    products, or been given immune (gamma) globulin or antiviral drug?   No   For women: Are you pregnant or is there a chance you could become       pregnant during the next month?   No   Have you received any vaccinations in the past 4 weeks?   No     Immunization questionnaire answers were all negative.    I have reviewed the following standing orders:   This patient is due and qualifies for the Influenza vaccine.    Click here for Influenza Vaccine Standing Order    I have reviewed the vaccines inclusion and exclusion criteria; No concerns regarding eligibility.     Patient instructed to remain in clinic for 15 minutes afterwards, and to report any adverse reactions.     Screening performed by  Darlene Darden MA on 11/22/2023 at 2:01 PM.

## 2023-12-23 ENCOUNTER — MYC MEDICAL ADVICE (OUTPATIENT)
Dept: FAMILY MEDICINE | Facility: CLINIC | Age: 38
End: 2023-12-23
Payer: OTHER GOVERNMENT

## 2023-12-26 NOTE — TELEPHONE ENCOUNTER
Order has been sent through for scheduling.  Derm Appts will contact patient to schedule.  Referral will be submitted to  after appt is scheduled.    Callie MARIE Referrals

## 2024-07-20 ENCOUNTER — HEALTH MAINTENANCE LETTER (OUTPATIENT)
Age: 39
End: 2024-07-20

## 2024-08-13 ENCOUNTER — OFFICE VISIT (OUTPATIENT)
Dept: FAMILY MEDICINE | Facility: CLINIC | Age: 39
End: 2024-08-13
Payer: OTHER GOVERNMENT

## 2024-08-13 VITALS
SYSTOLIC BLOOD PRESSURE: 94 MMHG | DIASTOLIC BLOOD PRESSURE: 64 MMHG | HEIGHT: 66 IN | TEMPERATURE: 97.4 F | OXYGEN SATURATION: 99 % | HEART RATE: 71 BPM | BODY MASS INDEX: 21.86 KG/M2 | WEIGHT: 136 LBS

## 2024-08-13 DIAGNOSIS — N92.6 IRREGULAR MENSTRUAL CYCLE: ICD-10-CM

## 2024-08-13 DIAGNOSIS — L65.9 HAIR THINNING: ICD-10-CM

## 2024-08-13 DIAGNOSIS — Z97.5 IUD (INTRAUTERINE DEVICE) IN PLACE: ICD-10-CM

## 2024-08-13 DIAGNOSIS — Z00.00 ANNUAL PHYSICAL EXAM: Primary | ICD-10-CM

## 2024-08-13 LAB
ALBUMIN SERPL BCG-MCNC: 4.5 G/DL (ref 3.5–5.2)
ALP SERPL-CCNC: 54 U/L (ref 40–150)
ALT SERPL W P-5'-P-CCNC: 10 U/L (ref 0–50)
ANION GAP SERPL CALCULATED.3IONS-SCNC: 9 MMOL/L (ref 7–15)
AST SERPL W P-5'-P-CCNC: 13 U/L (ref 0–45)
BASOPHILS # BLD AUTO: 0 10E3/UL (ref 0–0.2)
BASOPHILS NFR BLD AUTO: 0 %
BILIRUB SERPL-MCNC: 0.4 MG/DL
BUN SERPL-MCNC: 10.2 MG/DL (ref 6–20)
CALCIUM SERPL-MCNC: 9.2 MG/DL (ref 8.8–10.4)
CHLORIDE SERPL-SCNC: 104 MMOL/L (ref 98–107)
CHOLEST SERPL-MCNC: 216 MG/DL
CREAT SERPL-MCNC: 0.72 MG/DL (ref 0.51–0.95)
EGFRCR SERPLBLD CKD-EPI 2021: >90 ML/MIN/1.73M2
EOSINOPHIL # BLD AUTO: 0.1 10E3/UL (ref 0–0.7)
EOSINOPHIL NFR BLD AUTO: 2 %
ERYTHROCYTE [DISTWIDTH] IN BLOOD BY AUTOMATED COUNT: 12.3 % (ref 10–15)
ESTRADIOL SERPL-MCNC: 81 PG/ML
FASTING STATUS PATIENT QL REPORTED: YES
FASTING STATUS PATIENT QL REPORTED: YES
FSH SERPL IRP2-ACNC: 3.8 MIU/ML
GLUCOSE SERPL-MCNC: 94 MG/DL (ref 70–99)
HCG INTACT+B SERPL-ACNC: <1 MIU/ML
HCO3 SERPL-SCNC: 25 MMOL/L (ref 22–29)
HCT VFR BLD AUTO: 41.8 % (ref 35–47)
HDLC SERPL-MCNC: 73 MG/DL
HGB BLD-MCNC: 13.8 G/DL (ref 11.7–15.7)
IMM GRANULOCYTES # BLD: 0 10E3/UL
IMM GRANULOCYTES NFR BLD: 0 %
LDLC SERPL CALC-MCNC: 128 MG/DL
LH SERPL-ACNC: 5.1 MIU/ML
LYMPHOCYTES # BLD AUTO: 1.6 10E3/UL (ref 0.8–5.3)
LYMPHOCYTES NFR BLD AUTO: 33 %
MCH RBC QN AUTO: 32.5 PG (ref 26.5–33)
MCHC RBC AUTO-ENTMCNC: 33 G/DL (ref 31.5–36.5)
MCV RBC AUTO: 98 FL (ref 78–100)
MONOCYTES # BLD AUTO: 0.3 10E3/UL (ref 0–1.3)
MONOCYTES NFR BLD AUTO: 6 %
NEUTROPHILS # BLD AUTO: 2.8 10E3/UL (ref 1.6–8.3)
NEUTROPHILS NFR BLD AUTO: 59 %
NONHDLC SERPL-MCNC: 143 MG/DL
PLATELET # BLD AUTO: 202 10E3/UL (ref 150–450)
POTASSIUM SERPL-SCNC: 4.2 MMOL/L (ref 3.4–5.3)
PROT SERPL-MCNC: 6.9 G/DL (ref 6.4–8.3)
RBC # BLD AUTO: 4.25 10E6/UL (ref 3.8–5.2)
SODIUM SERPL-SCNC: 138 MMOL/L (ref 135–145)
TRIGL SERPL-MCNC: 73 MG/DL
TSH SERPL DL<=0.005 MIU/L-ACNC: 3.96 UIU/ML (ref 0.3–4.2)
WBC # BLD AUTO: 4.8 10E3/UL (ref 4–11)

## 2024-08-13 PROCEDURE — 82670 ASSAY OF TOTAL ESTRADIOL: CPT | Performed by: FAMILY MEDICINE

## 2024-08-13 PROCEDURE — 83002 ASSAY OF GONADOTROPIN (LH): CPT | Performed by: FAMILY MEDICINE

## 2024-08-13 PROCEDURE — 84443 ASSAY THYROID STIM HORMONE: CPT | Performed by: FAMILY MEDICINE

## 2024-08-13 PROCEDURE — 80053 COMPREHEN METABOLIC PANEL: CPT | Performed by: FAMILY MEDICINE

## 2024-08-13 PROCEDURE — 99395 PREV VISIT EST AGE 18-39: CPT | Performed by: FAMILY MEDICINE

## 2024-08-13 PROCEDURE — 83001 ASSAY OF GONADOTROPIN (FSH): CPT | Performed by: FAMILY MEDICINE

## 2024-08-13 PROCEDURE — 80061 LIPID PANEL: CPT | Performed by: FAMILY MEDICINE

## 2024-08-13 PROCEDURE — 84702 CHORIONIC GONADOTROPIN TEST: CPT | Performed by: FAMILY MEDICINE

## 2024-08-13 PROCEDURE — 85025 COMPLETE CBC W/AUTO DIFF WBC: CPT | Performed by: FAMILY MEDICINE

## 2024-08-13 PROCEDURE — 99213 OFFICE O/P EST LOW 20 MIN: CPT | Mod: 25 | Performed by: FAMILY MEDICINE

## 2024-08-13 PROCEDURE — 36415 COLL VENOUS BLD VENIPUNCTURE: CPT | Performed by: FAMILY MEDICINE

## 2024-08-13 SDOH — HEALTH STABILITY: PHYSICAL HEALTH: ON AVERAGE, HOW MANY DAYS PER WEEK DO YOU ENGAGE IN MODERATE TO STRENUOUS EXERCISE (LIKE A BRISK WALK)?: 2 DAYS

## 2024-08-13 ASSESSMENT — SOCIAL DETERMINANTS OF HEALTH (SDOH): HOW OFTEN DO YOU GET TOGETHER WITH FRIENDS OR RELATIVES?: TWICE A WEEK

## 2024-08-13 NOTE — PROGRESS NOTES
Preventive Care Visit  [facility] MIGUELINA Mcneil MD, Family Medicine  Aug 13, 2024      Assessment & Plan     Annual physical exam  Advised patient continue healthy lifestyle.  Check labs and notify with results.  - REVIEW OF HEALTH MAINTENANCE PROTOCOL ORDERS  - Luteinizing Hormone; Future  - Follicle stimulating hormone; Future  - Estradiol; Future  - TSH with free T4 reflex; Future  - Comprehensive metabolic panel; Future  - CBC with Platelets & Differential; Future  - Lipid Profile; Future  - HCG quantitative pregnancy; Future  - Luteinizing Hormone  - Follicle stimulating hormone  - Estradiol  - TSH with free T4 reflex  - Comprehensive metabolic panel  - CBC with Platelets & Differential  - Lipid Profile  - HCG quantitative pregnancy    IUD (intrauterine device) in place  Reviewed guidelines that patient can have Mirena up to 8 years.  She states for now she will continue Mirena and continue to monitor menstrual cycle.  Patient may consider removal and reinsertion next year, depending on how the next year of menstrual cycles go.    Irregular menstrual cycle  Discussed that irregular menstrual cycle is most likely caused by her Mirena.  Patient is concerned because she did not have menstrual changes for the first 4 years of having the Mirena and now does.  We ordered labs to rule out other causes for irregular menstrual cycle.    Hair thinning  Most likely combination of genetics and stress.  We will check labs including thyroid level and CBC to ensure no additional medical factors are contributing to hair thinning.              Counseling  Appropriate preventive services were addressed with this patient via screening, questionnaire, or discussion as appropriate for fall prevention, nutrition, physical activity, Tobacco-use cessation, weight loss and cognition.  Checklist reviewing preventive services available has been given to the patient.  Reviewed patient's diet, addressing concerns  and/or questions.   She is at risk for lack of exercise and has been provided with information to increase physical activity for the benefit of her well-being.           Ban Wang is a 38 year old, presenting for the following:  Physical (Annual px. Discuss IUD removal, cycles becoming more irregular in the past year,  sterile. )        11/22/2023     2:01 PM   Additional Questions   Roomed by [place]        Health Care Directive  Patient does not have a Health Care Directive or Living Will: Discussed advance care planning with patient; information given to patient to review.    HPI    Patient works as a .  She is going back to school next week and does feel some increased stress about going back to school.    Exercise: Running, more often in the summer.    Irregular menstrual: Patient had an IUD placed about 4.5 years ago and she has had light monthly periods since having IUD placed, however for the last year her periods have become more irregular.   this last month she had 2 light periods close together, which she described as spotting more than a period, and now has been 5 to 6 weeks since her last period, which is unusual for her.  Her  had a vasectomy.  While patient was trying to get pregnant and pregnant she was on thyroid replacement, but after pregnancy her thyroid levels were not abnormal enough to continue replacement.  Last year TSH levels were in the normal range.    Hair thinning: Patient's father has lost all his hair and mother has very fine hair.  Patient has noticed increased hair loss in the anterior part of her scalp, which was more prevalent when she was under stress during the school year.              8/13/2024   General Health   How would you rate your overall physical health? Good   Feel stress (tense, anxious, or unable to sleep) Not at all            8/13/2024   Nutrition   Three or more servings of calcium each day? (!) I DON'T KNOW   Diet:  Breakfast skipped   How many servings of fruit and vegetables per day? (!) 0-1   How many sweetened beverages each day? 0-1            8/13/2024   Exercise   Days per week of moderate/strenous exercise 2 days      (!) EXERCISE CONCERN      8/13/2024   Social Factors   Frequency of gathering with friends or relatives Twice a week   Worry food won't last until get money to buy more No   Food not last or not have enough money for food? No   Do you have housing? (Housing is defined as stable permanent housing and does not include staying ouside in a car, in a tent, in an abandoned building, in an overnight shelter, or couch-surfing.) Yes   Are you worried about losing your housing? No   Lack of transportation? No   Unable to get utilities (heat,electricity)? No            8/13/2024   Dental   Dentist two times every year? Yes            8/13/2024   TB Screening   Were you born outside of the [place]? No            Today's PHQ-2 Score:       8/13/2024     8:35 AM   PHQ-2 ( 1999 [employer])   Q1: Little interest or pleasure in doing things 0   Q2: Feeling down, depressed or hopeless 0   PHQ-2 Score 0   Q1: Little interest or pleasure in doing things Not at all   Q2: Feeling down, depressed or hopeless Not at all   PHQ-2 Score 0           8/13/2024   Substance Use   Alcohol more than 3/day or more than 7/wk No   Do you use any other substances recreationally? No        Social History     Tobacco Use    Smoking status: Never     Passive exposure: Never    Smokeless tobacco: Never   Vaping Use    Vaping status: Never Used   Substance Use Topics    Alcohol use: Yes     Alcohol/week: 2.0 - 4.0 standard drinks of alcohol           12/22/2022   LAST FHS-7 RESULTS   1st degree relative breast or ovarian cancer No   Any relative bilateral breast cancer No   Any male have breast cancer No   Any ONE woman have BOTH breast AND ovarian cancer No   Any woman with breast cancer before 50yrs No   2 or more relatives with breast AND/OR ovarian  "cancer No   2 or more relatives with breast AND/OR bowel cancer No                   8/13/2024   STI Screening   New sexual partner(s) since last STI/HIV test? No        History of abnormal Pap smear: No - age 30- 64 PAP with HPV every 5 years recommended        Latest Ref Rng & Units 6/12/2023     3:34 PM   PAP / HPV   PAP  Negative for Intraepithelial Lesion or Malignancy (NILM)    HPV 16 DNA Negative Negative    HPV 18 DNA Negative Negative    Other HR HPV Negative Negative            8/13/2024   Contraception/Family Planning   Questions about contraception or family planning (!) YES - IUD questions           Reviewed and updated as needed this visit by Provider      Problems                     Review of Systems  Constitutional, HEENT, cardiovascular, pulmonary, gi and gu systems are negative, except as otherwise noted.     Objective    Exam  BP 94/64 (BP Location: Left arm, Patient Position: Sitting, Cuff Size: Adult Regular)   Pulse 71   Temp 97.4  F (36.3  C) (Temporal)   Ht 1.676 m (5' 6\")   Wt 61.7 kg (136 lb)   LMP 07/12/2024 (Within Days)   SpO2 99%   BMI 21.95 kg/m     Estimated body mass index is 21.95 kg/m  as calculated from the following:    Height as of this encounter: 1.676 m (5' 6\").    Weight as of this encounter: 61.7 kg (136 lb).    Physical Exam  GENERAL: alert and no distress  EYES: Eyes grossly normal to inspection, PERRL and conjunctivae and sclerae normal  HENT: ear canals and TM's normal, nose and mouth without ulcers or lesions  NECK: no adenopathy, no asymmetry, masses, or scars  RESP: lungs clear to auscultation - no rales, rhonchi or wheezes  CV: regular rate and rhythm, normal S1 S2, no S3 or S4, no murmur, click or rub, no peripheral edema  ABDOMEN: soft, nontender, no hepatosplenomegaly, no masses and bowel sounds normal  MS: no gross musculoskeletal defects noted, no edema  SKIN: no suspicious lesions or rashes  NEURO: Normal strength and tone, mentation intact and speech " normal  PSYCH: mentation appears normal, affect normal/bright        Signed Electronically by: Cristiano Mcneil MD

## 2024-11-09 ENCOUNTER — IMMUNIZATION (OUTPATIENT)
Dept: FAMILY MEDICINE | Facility: CLINIC | Age: 39
End: 2024-11-09
Payer: OTHER GOVERNMENT

## 2024-11-09 PROCEDURE — 90471 IMMUNIZATION ADMIN: CPT

## 2024-11-09 PROCEDURE — 90656 IIV3 VACC NO PRSV 0.5 ML IM: CPT

## 2025-02-17 ENCOUNTER — PATIENT OUTREACH (OUTPATIENT)
Dept: CARE COORDINATION | Facility: CLINIC | Age: 40
End: 2025-02-17
Payer: COMMERCIAL

## 2025-02-19 ENCOUNTER — PATIENT OUTREACH (OUTPATIENT)
Dept: CARE COORDINATION | Facility: CLINIC | Age: 40
End: 2025-02-19
Payer: COMMERCIAL

## 2025-04-29 ENCOUNTER — OFFICE VISIT (OUTPATIENT)
Dept: PODIATRY | Facility: CLINIC | Age: 40
End: 2025-04-29
Attending: NURSE PRACTITIONER
Payer: COMMERCIAL

## 2025-04-29 ENCOUNTER — ANCILLARY PROCEDURE (OUTPATIENT)
Dept: GENERAL RADIOLOGY | Facility: CLINIC | Age: 40
End: 2025-04-29
Attending: PODIATRIST
Payer: COMMERCIAL

## 2025-04-29 VITALS — WEIGHT: 135 LBS | BODY MASS INDEX: 22.49 KG/M2 | HEIGHT: 65 IN

## 2025-04-29 DIAGNOSIS — M77.52 CAPSULITIS OF METATARSOPHALANGEAL (MTP) JOINT OF LEFT FOOT: ICD-10-CM

## 2025-04-29 DIAGNOSIS — M77.51 CAPSULITIS OF METATARSOPHALANGEAL (MTP) JOINT OF RIGHT FOOT: ICD-10-CM

## 2025-04-29 DIAGNOSIS — M21.621 TAILOR'S BUNION OF BOTH FEET: Primary | ICD-10-CM

## 2025-04-29 DIAGNOSIS — M89.8X7 EXOSTOSIS OF RIGHT FOOT: ICD-10-CM

## 2025-04-29 DIAGNOSIS — M21.622 TAILOR'S BUNION OF BOTH FEET: Primary | ICD-10-CM

## 2025-04-29 DIAGNOSIS — M79.609 PAIN IN LIMB: ICD-10-CM

## 2025-04-29 PROCEDURE — 99203 OFFICE O/P NEW LOW 30 MIN: CPT | Performed by: PODIATRIST

## 2025-04-29 RX ORDER — NAPROXEN 500 MG/1
500 TABLET ORAL 2 TIMES DAILY WITH MEALS
Qty: 28 TABLET | Refills: 0 | Status: SHIPPED | OUTPATIENT
Start: 2025-04-29

## 2025-04-29 NOTE — LETTER
4/29/2025      Kathleen Wilson  [address] [address]      Dear Colleague,    Thank you for referring your patient, Kathleen Wilson, to the [facility]. Please see a copy of my visit note below.          FOOT AND ANKLE SURGERY/PODIATRY CONSULT NOTE         ASSESSMENT:   Capsulitis fifth MPJ right foot  Tailor's bunion bilateral feet  Exostosis right midfoot        TREATMENT:  - I discussed with the patient that she does have mild pain on exam along the fifth MPJ on the right foot consistent with capsulitis.  She does believe bunion on exam today on both feet.      -I reviewed the patient's weightbearing x-rays of the right foot which indicate increased IM for 5 consistent with a mild to moderate tailor's bunion.    -Based on the above, we discussed that her symptoms are likely due to inflammation of the fifth MPJ joint capsule or capsulitis at this time.  Reviewed conservative treatment options including anti-inflammatory medication, reduced running and long walks over the next 2 weeks.    -I will start her on naproxen at this time.  Will consider steroid injection or 12-day taper course of oral prednisone if symptoms persist.    -Patient's questions invited and answered. She was encouraged to call my office with any further questions or concerns.     30 minutes spent on the day of encounter doing chart review, history and exam, documentation, and further activities as noted.     Michel Hadley DPM  [facility] Podiatry/Foot & Ankle Surgery      HPI: I was asked to see Kathleen Wilson today for pain along the tailor's bunion right foot.  Patient reports that she has had ongoing discomfort over the past several weeks along the fifth MPJ on the right foot.  Minimal discomfort fifth MPJ left foot.  She describes having increased symptoms accommodative shoe gear in the form of gym shoes which has helped her symptoms decreased by approximately 50%.  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  She has tried past medical history reviewed.    No past medical history on file.      Social History     Socioeconomic History     Marital status:      Spouse name: Not on file     Number of children: Not on file     Years of education: Not on file     Highest education level: Not on file   Occupational History     Not on file   Tobacco Use     Smoking status: Never     Passive exposure: Never     Smokeless tobacco: Never   Vaping Use     Vaping status: Never Used   Substance and Sexual Activity     Alcohol use: Yes     Alcohol/week: 2.0 - 4.0 standard drinks of alcohol     Drug use: Not on file     Sexual activity: Yes     Partners: Male     Birth control/protection: I.U.D.   Other Topics Concern     Not on file   Social History Narrative     Not on file     Social Drivers of Health     Financial Resource Strain: Low Risk  (8/13/2024)    Financial Resource Strain      Within the past 12 months, have you or your family members you live with been unable to get utilities (heat, electricity) when it was really needed?: No   Food Insecurity: Low Risk  (8/13/2024)    Food Insecurity      Within the past 12 months, did you worry that your food would run out before you got money to buy more?: No      Within the past 12 months, did the food you bought just not last and you didn t have money to get more?: No   Transportation Needs: Low Risk  (8/13/2024)    Transportation Needs      Within the past 12 months, has lack of transportation kept you from medical appointments, getting your medicines, non-medical meetings or appointments, work, or from getting things that you need?: No   Physical Activity: Unknown (8/13/2024)    Exercise Vital Sign      Days of Exercise per Week: 2 days      Minutes of Exercise per Session: Not on file   Stress: No Stress Concern Present (8/13/2024)    [employer] - Occupational Stress Questionnaire      Feeling of  "Stress : Not at all   Social Connections: Unknown (8/13/2024)    Social Connection and Isolation Panel [NHANES]      Frequency of Communication with Friends and Family: Not on file      Frequency of Social Gatherings with Friends and Family: Twice a week      Attends [employer] Services: Not on file      Active Member of Clubs or Organizations: Not on file      Attends Club or Organization Meetings: Not on file      Marital Status: Not on file   Interpersonal Safety: Low Risk  (8/13/2024)    Interpersonal Safety      Do you feel physically and emotionally safe where you currently live?: Yes      Within the past 12 months, have you been hit, slapped, kicked or otherwise physically hurt by someone?: No      Within the past 12 months, have you been humiliated or emotionally abused in other ways by your partner or ex-partner?: No   Housing Stability: Low Risk  (8/13/2024)    Housing Stability      Do you have housing? : Yes      Are you worried about losing your housing?: No            Allergies   Allergen Reactions     Amoxicillin Swelling         MEDICATIONS:   Current Outpatient Medications   Medication Sig Dispense Refill     levonorgestrel (MIRENA) 52 MG (20 mcg/day) IUD 1 each by Intrauterine route once for 1 dose IUD: Mirena  Placed: 27Jan2020  Needs to be removed by: 27Jan2025  [facility]  [phone] 1 each 0     naproxen (NAPROSYN) 500 MG tablet Take 1 tablet (500 mg) by mouth 2 times daily (with meals). 28 tablet 0     No current facility-administered medications for this visit.        Family History   Problem Relation Age of Onset     Hyperlipidemia Mother      Hypertension Father           Review of Systems - 10 point Review of Systems is negative except for foot pain which is noted in HPI.    OBJECTIVE:  Appearance: alert, well appearing, and in no distress.    VITAL SIGNS: Ht 1.651 m (5' 5\")   Wt 61.2 kg (135 lb)   BMI 22.47 kg/m        General appearance: Patient is alert and fully cooperative " with history & exam.  No sign of distress is noted during the visit.     Psychiatric: Affect is pleasant & appropriate.  Patient appears motivated to improve health.     Respiratory: Breathing is regular & unlabored while sitting.     HEENT: Hearing is intact to spoken word.  Speech is clear.  No gross evidence of visual impairment that would impact ambulation.      Vascular: Dorsalis pedis and posterior tibial pulses are palpable. There is pedal hair growth bilateral.  CFT < 3 sec from anterior tibial surface to distal digits bilateral. There is no appreciable edema noted.  Dermatologic: Turgor and texture are within normal limits. No coloration or temperature changes. No primary or secondary lesions noted.  Neurologic: All epicritic and proprioceptive sensations are grossly intact bilateral.  Musculoskeletal: Mild pain to palpation fifth MPJ right foot.  Mild to moderate tailor's bunions bilateral feet.  Small bony prominence along dorsal second tarsometatarsal joint right foot.          Again, thank you for allowing me to participate in the care of your patient.        Sincerely,        Michel Hadley DPM    Electronically signed

## 2025-04-29 NOTE — PROGRESS NOTES
FOOT AND ANKLE SURGERY/PODIATRY CONSULT NOTE         ASSESSMENT:   Capsulitis fifth MPJ right foot  Tailor's bunion bilateral feet  Exostosis right midfoot        TREATMENT:  - I discussed with the patient that she does have mild pain on exam along the fifth MPJ on the right foot consistent with capsulitis.  She does believe bunion on exam today on both feet.      -I reviewed the patient's weightbearing x-rays of the right foot which indicate increased IM for 5 consistent with a mild to moderate tailor's bunion.    -Based on the above, we discussed that her symptoms are likely due to inflammation of the fifth MPJ joint capsule or capsulitis at this time.  Reviewed conservative treatment options including anti-inflammatory medication, reduced running and long walks over the next 2 weeks.    -I will start her on naproxen at this time.  Will consider steroid injection or 12-day taper course of oral prednisone if symptoms persist.    -Patient's questions invited and answered. She was encouraged to call my office with any further questions or concerns.     30 minutes spent on the day of encounter doing chart review, history and exam, documentation, and further activities as noted.     Michel Hadley DPM  [facility]/Foot & Ankle Surgery      HPI: I was asked to see Kathleen Wilson today for pain along the tailor's bunion right foot.  Patient reports that she has had ongoing discomfort over the past several weeks along the fifth MPJ on the right foot.  Minimal discomfort fifth MPJ left foot.  She describes having increased symptoms accommodative shoe gear in the form of gym shoes which has helped her symptoms decreased by approximately 50%.  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  She has tried past medical history reviewed.    No past medical history on file.      Social History     Socioeconomic History    Marital status:      Spouse name: Not on file    Number of children:  Not on file    Years of education: Not on file    Highest education level: Not on file   Occupational History    Not on file   Tobacco Use    Smoking status: Never     Passive exposure: Never    Smokeless tobacco: Never   Vaping Use    Vaping status: Never Used   Substance and Sexual Activity    Alcohol use: Yes     Alcohol/week: 2.0 - 4.0 standard drinks of alcohol    Drug use: Not on file    Sexual activity: Yes     Partners: Male     Birth control/protection: I.U.D.   Other Topics Concern    Not on file   Social History Narrative    Not on file     Social Drivers of Health     Financial Resource Strain: Low Risk  (8/13/2024)    Financial Resource Strain     Within the past 12 months, have you or your family members you live with been unable to get utilities (heat, electricity) when it was really needed?: No   Food Insecurity: Low Risk  (8/13/2024)    Food Insecurity     Within the past 12 months, did you worry that your food would run out before you got money to buy more?: No     Within the past 12 months, did the food you bought just not last and you didn t have money to get more?: No   Transportation Needs: Low Risk  (8/13/2024)    Transportation Needs     Within the past 12 months, has lack of transportation kept you from medical appointments, getting your medicines, non-medical meetings or appointments, work, or from getting things that you need?: No   Physical Activity: Unknown (8/13/2024)    Exercise Vital Sign     Days of Exercise per Week: 2 days     Minutes of Exercise per Session: Not on file   Stress: No Stress Concern Present (8/13/2024)    [employer] [place] [employer] - Occupational Stress Questionnaire     Feeling of Stress : Not at all   Social Connections: Unknown (8/13/2024)    Social Connection and Isolation Panel [NHANES]     Frequency of Communication with Friends and Family: Not on file     Frequency of Social Gatherings with Friends and Family: Twice a week     Attends [employer]  "Services: Not on file     Active Member of Clubs or Organizations: Not on file     Attends Club or Organization Meetings: Not on file     Marital Status: Not on file   Interpersonal Safety: Low Risk  (8/13/2024)    Interpersonal Safety     Do you feel physically and emotionally safe where you currently live?: Yes     Within the past 12 months, have you been hit, slapped, kicked or otherwise physically hurt by someone?: No     Within the past 12 months, have you been humiliated or emotionally abused in other ways by your partner or ex-partner?: No   Housing Stability: Low Risk  (8/13/2024)    Housing Stability     Do you have housing? : Yes     Are you worried about losing your housing?: No            Allergies   Allergen Reactions    Amoxicillin Swelling         MEDICATIONS:   Current Outpatient Medications   Medication Sig Dispense Refill    levonorgestrel (MIRENA) 52 MG (20 mcg/day) IUD 1 each by Intrauterine route once for 1 dose IUD: Mirena  Placed: 27Jan2020  Needs to be removed by: 27Jan2025  [facility]  [phone] 1 each 0    naproxen (NAPROSYN) 500 MG tablet Take 1 tablet (500 mg) by mouth 2 times daily (with meals). 28 tablet 0     No current facility-administered medications for this visit.        Family History   Problem Relation Age of Onset    Hyperlipidemia Mother     Hypertension Father           Review of Systems - 10 point Review of Systems is negative except for foot pain which is noted in HPI.    OBJECTIVE:  Appearance: alert, well appearing, and in no distress.    VITAL SIGNS: Ht 1.651 m (5' 5\")   Wt 61.2 kg (135 lb)   BMI 22.47 kg/m        General appearance: Patient is alert and fully cooperative with history & exam.  No sign of distress is noted during the visit.     Psychiatric: Affect is pleasant & appropriate.  Patient appears motivated to improve health.     Respiratory: Breathing is regular & unlabored while sitting.     HEENT: Hearing is intact to spoken word.  Speech is " clear.  No gross evidence of visual impairment that would impact ambulation.      Vascular: Dorsalis pedis and posterior tibial pulses are palpable. There is pedal hair growth bilateral.  CFT < 3 sec from anterior tibial surface to distal digits bilateral. There is no appreciable edema noted.  Dermatologic: Turgor and texture are within normal limits. No coloration or temperature changes. No primary or secondary lesions noted.  Neurologic: All epicritic and proprioceptive sensations are grossly intact bilateral.  Musculoskeletal: Mild pain to palpation fifth MPJ right foot.  Mild to moderate tailor's bunions bilateral feet.  Small bony prominence along dorsal second tarsometatarsal joint right foot.

## 2025-04-29 NOTE — PATIENT INSTRUCTIONS
Avoid running/long walks for the next two weeks    Naproxen and naproxen sodium oral immediate-release tablets    Brand Names: Aflaxen, Aleve, Aleve Arthritis, All Day Relief, Anaprox, Anaprox DS, Naprosyn   What is this medicine?  NAPROXEN (na PROX en) is a non-steroidal anti-inflammatory drug (NSAID). It is used to reduce swelling and to treat pain. This medicine may be used for dental pain, headache, or painful monthly periods. It is also used for painful joint and muscular problems such as arthritis, tendinitis, bursitis, and gout.  How should I use this medicine?  Take this medicine by mouth with a glass of water. Follow the directions on the prescription label. Take it with food if your stomach gets upset. Try to not lie down for at least 10 minutes after you take it. Take your medicine at regular intervals. Do not take your medicine more often than directed. Long-term, continuous use may increase the risk of heart attack or stroke.  A special MedGuide will be given to you by the pharmacist with each prescription and refill. Be sure to read this information carefully each time.  Talk to your pediatrician regarding the use of this medicine in children. Special care may be needed.  What side effects may I notice from receiving this medicine?  Side effects that you should report to your doctor or health care professional as soon as possible:  black or bloody stools, blood in the urine or vomit  blurred vision  chest pain  difficulty breathing or wheezing  nausea or vomiting  severe stomach pain  skin rash, skin redness, blistering or peeling skin, hives, or itching  slurred speech or weakness on one side of the body  swelling of eyelids, throat, lips  unexplained weight gain or swelling  unusually weak or tired  yellowing of eyes or skin  Side effects that usually do not require medical attention (report to your doctor or health care professional if they continue or are  bothersome):  constipation  headache  heartburn  What may interact with this medicine?  alcohol  aspirin  cidofovir  diuretics  lithium  methotrexate  other drugs for inflammation like ketorolac or prednisone  pemetrexed  probenecid  warfarin  What if I miss a dose?  If you miss a dose, take it as soon as you can. If it is almost time for your next dose, take only that dose. Do not take double or extra doses.  Where should I keep my medicine?  Keep out of the reach of children.  Store at room temperature between 15 and 30 degrees C (59 and 86 degrees F). Keep container tightly closed. Throw away any unused medicine after the expiration date.  What should I tell my health care provider before I take this medicine?  They need to know if you have any of these conditions:  cigarette smoker  coronary artery bypass graft (CABG) surgery within the past 2 weeks  drink more than 3 alcohol-containing drinks a day  heart disease  high blood pressure  history of stomach bleeding  kidney disease  liver disease  lung or breathing disease, like asthma  an unusual or allergic reaction to naproxen, aspirin, other NSAIDs, other medicines, foods, dyes, or preservatives  pregnant or trying to get pregnant  breast-feeding  What should I watch for while using this medicine?  Tell your doctor or health care professional if your pain does not get better. Talk to your doctor before taking another medicine for pain. Do not treat yourself.  This medicine does not prevent heart attack or stroke. In fact, this medicine may increase the chance of a heart attack or stroke. The chance may increase with longer use of this medicine and in people who have heart disease. If you take aspirin to prevent heart attack or stroke, talk with your doctor or health care professional.  Do not take other medicines that contain aspirin, ibuprofen, or naproxen with this medicine. Side effects such as stomach upset, nausea, or ulcers may be more likely to occur.  Many medicines available without a prescription should not be taken with this medicine.  This medicine can cause ulcers and bleeding in the stomach and intestines at any time during treatment. Do not smoke cigarettes or drink alcohol. These increase irritation to your stomach and can make it more susceptible to damage from this medicine. Ulcers and bleeding can happen without warning symptoms and can cause death.  You may get drowsy or dizzy. Do not drive, use machinery, or do anything that needs mental alertness until you know how this medicine affects you. Do not stand or sit up quickly, especially if you are an older patient. This reduces the risk of dizzy or fainting spells.  This medicine can cause you to bleed more easily. Try to avoid damage to your teeth and gums when you brush or floss your teeth.    Medicine is given to help treat or prevent illness. But if you don t take it correctly, it might not help. It might even harm you. Your healthcare provider or pharmacist can help you learn the right way to take your medicine. Listed below are some tips to help you take medicine safely.  Safety tips  Have a routine for taking each medicine. Make it part of something you do each day, such as brushing your teeth or eating a meal.  When you go to the hospital or your healthcare provider s office, bring all your current medicines in their original boxes or bottles. If you can t do that, bring an up-to-date list of your medicines.  Don't stop taking a prescription medicine unless your healthcare provider tells you to. Doing so could make your condition worse.  Don't share medicines.  Let your healthcare provider and pharmacist know of any allergies you have.  Taking prescription medicines with alcohol, street drugs, herbs, supplements, or even some over-the-counter medicines can be harmful. Talk to your healthcare provider or pharmacist before using any of these things while taking a prescription medicine.  When  filling your prescriptions, try using the same pharmacy for all your medicines. If that isn't possible, let each pharmacist know what medicines you are already taking.  Keep medicines out of the reach of children and pets. Store medicines in a cool, dry, dark place -- not in the bathroom or in the kitchen near moisture or heat.  Don't use medicine that has  or that doesn t look or smell right. Call your pharmacist for instructions on how to dispose of your medicines or where you can take them for safe disposal.  Medicine that comes in a container for a single dose should be used only 1 time. If you use the container a second time, it may have germs in it that can cause illness. These illnesses include hepatitis B and C. They also include infections of the brain or spinal cord (meningitis and epidural abscess).

## 2025-07-21 ENCOUNTER — PATIENT OUTREACH (OUTPATIENT)
Dept: CARE COORDINATION | Facility: CLINIC | Age: 40
End: 2025-07-21
Payer: COMMERCIAL